# Patient Record
Sex: MALE
[De-identification: names, ages, dates, MRNs, and addresses within clinical notes are randomized per-mention and may not be internally consistent; named-entity substitution may affect disease eponyms.]

---

## 2018-09-28 ENCOUNTER — HOSPITAL ENCOUNTER (OUTPATIENT)
Dept: HOSPITAL 14 - H.OPSURG | Age: 68
Discharge: HOME | End: 2018-09-28
Attending: SURGERY
Payer: COMMERCIAL

## 2018-09-28 VITALS
OXYGEN SATURATION: 96 % | SYSTOLIC BLOOD PRESSURE: 106 MMHG | DIASTOLIC BLOOD PRESSURE: 65 MMHG | HEART RATE: 69 BPM | TEMPERATURE: 97.5 F

## 2018-09-28 VITALS — BODY MASS INDEX: 26.6 KG/M2

## 2018-09-28 VITALS — RESPIRATION RATE: 18 BRPM

## 2018-09-28 DIAGNOSIS — K64.4: ICD-10-CM

## 2018-09-28 DIAGNOSIS — E78.5: ICD-10-CM

## 2018-09-28 DIAGNOSIS — K64.9: Primary | ICD-10-CM

## 2018-09-28 DIAGNOSIS — I10: ICD-10-CM

## 2018-09-28 PROCEDURE — 88304 TISSUE EXAM BY PATHOLOGIST: CPT

## 2018-09-28 PROCEDURE — 46250 REMOVE EXT HEM GROUPS 2+: CPT

## 2018-09-28 NOTE — CP.SDSHP
Same Day Surgery H & P





- History


Proposed Procedure: See H&P in the chart, no changes





- Allergies


Allergies: 


Allergies





No Known Allergies Allergy (Verified 09/28/18 06:52)


   











- Physical Exam


Vital Signs: 


                                   Vital Signs











  09/28/18 09/28/18





  07:23 07:27


 


Temperature 98.1 F 


 


Pulse Rate 66 66


 


Respiratory 18 





Rate  


 


Blood Pressure 116/69 


 


O2 Sat by Pulse 94 L 





Oximetry  














Short Stay Discharge





- Short Stay Discharge


Admitting Diagnosis/Reason for Visit: K64.9


Disposition: HOME/ ROUTINE


Referrals: 


Jayde Graham MD [Primary Care Provider] -

## 2018-09-28 NOTE — PCM.SURG1
Surgeon's Initial Post Op Note





- Surgeon's Notes


Surgeon: Merry


Assistant: 


Type of Anesthesia: General Endo


Anesthesia Administered By: Chantell


Pre-Operative Diagnosis: external hemorrhoid


Operative Findings: internal hemorrhoids, 2 engorged columns


Post-Operative Diagnosis: same


Operation Performed: External hemorrhoidectomy


Specimen/Specimens Removed: hemorrhoid


Estimated Blood Loss: EBL {In ML}: 1


Blood Products Given: N/A


Drains Used: No Drains


Post-Op Condition: Good


Date of Surgery/Procedure: 09/28/18


Time of Surgery/Procedure: 09:20

## 2018-11-30 ENCOUNTER — HOSPITAL ENCOUNTER (OUTPATIENT)
Dept: HOSPITAL 14 - H.OPSURG | Age: 68
Discharge: HOME | End: 2018-11-30
Attending: SURGERY
Payer: COMMERCIAL

## 2018-11-30 VITALS — SYSTOLIC BLOOD PRESSURE: 107 MMHG | DIASTOLIC BLOOD PRESSURE: 64 MMHG | TEMPERATURE: 97.5 F | HEART RATE: 60 BPM

## 2018-11-30 VITALS — RESPIRATION RATE: 18 BRPM | OXYGEN SATURATION: 94 %

## 2018-11-30 VITALS — BODY MASS INDEX: 25.7 KG/M2

## 2018-11-30 DIAGNOSIS — K64.8: Primary | ICD-10-CM

## 2018-11-30 DIAGNOSIS — I10: ICD-10-CM

## 2018-11-30 PROCEDURE — 46260 REMOVE IN/EX HEM GROUPS 2+: CPT

## 2018-11-30 PROCEDURE — 88304 TISSUE EXAM BY PATHOLOGIST: CPT

## 2018-11-30 NOTE — CP.SDSHP
Same Day Surgery H & P





- History


Proposed Procedure: Hemorrhoidectomy


Pre-Op Diagnosis: SAme





- Previous Medical/Surgical History


Cardiac: Hypertension


Pain: 2.Mild Pain


Previous Surgical History: hemorrhoidectomy





- Allergies


Allergies: 


Allergies





No Known Allergies Allergy (Verified 11/30/18 07:48)


   











- Current Medications


Current Medications: 


See chart





- Physical Exam


General Appearance: NAD


Vital Signs: 


                                   Vital Signs











  11/30/18 11/30/18





  08:22 08:26


 


Temperature 97.7 F 


 


Pulse Rate 70 70


 


Respiratory 18 





Rate  


 


Blood Pressure 107/67 


 


O2 Sat by Pulse 95 





Oximetry  











Mental Status: Alert & Oriented x3


Neuro: WNL


Heart: WNL


Lungs: WNL


GI: WNL





- {Optional Preform as Required}


Abdomen: WNL


Rectal: Other (R 11o clock hemorrhoid, L lateral hemorrhoid)


Integument: WNL


GYN: WNL


Ortho: WNL


ENT: WNL





- Impression


Impression: Hemorrhoids


Pt. Evaluated Today:Candidate for Anesthesia & Procedure: Yes





- Date & Time


Date: 11/30/18


Time: 11:27





Short Stay Discharge





- Short Stay Discharge


Admitting Diagnosis/Reason for Visit: K64.9


Disposition: HOME/ ROUTINE


Referrals: 


Jayde Graham MD [Primary Care Provider] - 


Instructions:  Hemorrhoidectomy (DC)


Additional Instructions (Diet, Activity): 


follow up at Dr. Sousa's office in 1-2 weeks. 


There is white form in the rectum. It will come out when defecate. 


Take pain med as needed. 


Take over the counter stool softner

## 2018-11-30 NOTE — OP
PROCEDURE DATE:  11/30/2018



PREOPERATIVE DIAGNOSIS:  Hemorrhoid.



POSTOPERATIVE DIAGNOSIS:  Hemorrhoid.



PROCEDURE:  Hemorrhoidectomy.



SURGEON:  Manish Sousa MD.



ASSISTANT:  Dr. Montero.



TYPE OF ANESTHESIA:  General endotracheal intubation.



ANESTHESIA ADMINISTERED BY:  Dr. Telles.



IV FLUID INTAKE:  Crystalloids.



ESTIMATED BLOOD LOSS:  20 mL.



INTRAOPERATIVE FINDINGS:  Engorged hemorrhoid at the 11 o'clock position on

the right side.



SPECIMEN:  Hemorrhoid.



BRIEF HISTORY:  The patient is a very pleasant 68-year-old gentleman who is

known to me from his prior one column hemorrhoidectomy.  The patient did

well after his initial surgery and re-presented to the office asking for

the other 2 columns to be excised.  All the risks and benefits of the

procedure were explained to the patient and with the patient having a full

understanding of all the risks and benefits involved, informed consent was

obtained and the patient was taken to the operating for above-stated

procedure.



DESCRIPTION OF PROCEDURE:  The patient was brought into the operating room

and was intubated in a stretcher.  Bilateral Flowtron boots were applied to

patient's lower extremities.  Subsequent to that, the patient was placed in

a prone position on the operating room table.  Subsequent to that, a

retraction of the buttocks was obtained with white silk tape and subsequent

to that the patient's buttock area and perianal area were prepped with

Betadine and draped in a standard surgical fashion.  Prior to the beginning

of the procedure, time-out was called in the room and everyone in the room

were in agreement.  The patient received prophylactic Ancef antibiotic

prior to the incision time.  Using digital rectal examination, the rectal

vault was examined.  There appeared to be a no palpable masses, however

there was a palpable internal hemorrhoid at 11 o'clock position.  At this

point in time, the anal retractor was inserted into patient's anal canal

and the anal canal was inspected.  There appeared to be a well healed scar

from prior one column hemorrhoidectomy, however at the 11 o'clock position

the hemorrhoid appeared to be very engorged and had both internal and

external component to it.  There also appeared to be a second column of

hemorrhoid that was mildly engorged.  So, I made a decision to take the

column a the 11 o'clock positions since it was the biggest one.  Hemorrhoid

clamp was placed on the hemorrhoid and subsequent to that, using 15-blade

scalpel knife, the anoderm as well as the distal rectum mucosa were incised

and subsequent to that hemorrhoid was transected off using electrical

cautery.  At this point in time, the hemorrhoids was passed off to the [facility] as a specimen.  At this point in time, the hemostasis was achieved

with electrical cautery.  The patient's rectal vault was irrigated and

dried, hemostasis was confirmed.  At this point in time, the anoderm defect

as well as defect of the distal rectal mucosa were closed with interrupted

3-0 chromic sutures and a SH needle and once the approximation was

satisfactory, the wound was injected with 10 mL of 0.5% Marcaine

anesthetic.  The patient's perianal area and buttocks were washed and dried

and a rolled up large Gelfoam was inserted into the anal canal.  At this

point in time, the patient was successfully placed back on a stretcher, was

successfully extubated by the anesthesia team, and taken to the recovery

room in a stable condition.  At the end of the procedure, all instrument

counts, needles and sponges were correct.







__________________________________________

Manish Sousa MD



DD:  11/30/2018 11:27:28

DT:  11/30/2018 12:29:53

Job # [id number]

## 2018-11-30 NOTE — PCM.SURG1
Surgeon's Initial Post Op Note





- Surgeon's Notes


Surgeon: Dr. Sousa 


Assistant: Elio Montero PGY3


Type of Anesthesia: General Endo, Local


Anesthesia Administered By: Elfego


Pre-Operative Diagnosis: hemorrhoids


Operative Findings: 11 oclock R hemorrhoid external and internal [id number], L 

lateral hemorrhoid


Post-Operative Diagnosis: hemorrhoids


Operation Performed: Hemorrhoidectomy of 11 oclock R hemorrhoid external and 

internal [id number].  closed with interrupted chromic stitches


Specimen/Specimens Removed: 11 oclock R hemorrhoid external and internal [id number]


Estimated Blood Loss: EBL {In ML}: 20


Blood Products Given: N/A


Drains Used: No Drains


Post-Op Condition: Good


Date of Surgery/Procedure: 11/30/18


Time of Surgery/Procedure: 11:31